# Patient Record
Sex: MALE | Race: WHITE | NOT HISPANIC OR LATINO | URBAN - METROPOLITAN AREA
[De-identification: names, ages, dates, MRNs, and addresses within clinical notes are randomized per-mention and may not be internally consistent; named-entity substitution may affect disease eponyms.]

---
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
## 2021-02-26 ENCOUNTER — VIRTUAL VISIT (OUTPATIENT)
Dept: FAMILY MEDICINE | Facility: CLINIC | Age: 21
End: 2021-02-26
Payer: COMMERCIAL

## 2021-02-26 DIAGNOSIS — J02.9 SORE THROAT: Primary | ICD-10-CM

## 2021-02-26 PROCEDURE — 99203 OFFICE O/P NEW LOW 30 MIN: CPT | Mod: TEL | Performed by: NURSE PRACTITIONER

## 2021-02-26 RX ORDER — AMOXICILLIN 500 MG/1
500 CAPSULE ORAL 2 TIMES DAILY
Qty: 20 CAPSULE | Refills: 0 | Status: SHIPPED | OUTPATIENT
Start: 2021-02-26 | End: 2021-03-08

## 2021-02-26 NOTE — PROGRESS NOTES
Floyd is a 20 year old who is being evaluated via a billable telephone visit.      What phone number would you like to be contacted at? [phone]  How would you like to obtain your AVS?     Assessment & Plan   Problem List Items Addressed This Visit     None      Visit Diagnoses     Sore throat    -  Primary    Relevant Medications    amoxicillin (AMOXIL) 500 MG capsule         Treating for presumed strep throat  amoxicillin 500 mg BID x 10 days   gargle salt and water     5 minutes spent on the date of the encounter doing chart review, history and exam, documentation and further activities as noted above  0956}     PRN    No follow-ups on file.    GAY Vargas CNP  [facility]   Floyd is a 20 year old who presents for the following health issues     HPI       Acute Illness  Acute illness concerns: sore throat  Onset/Duration: 4 days, painful to swallow   Symptoms:  Fever: no  Chills/Sweats: YES  Headache (location?): YES  Sinus Pressure: no  Conjunctivitis:  no  Ear Pain: no  Rhinorrhea: YES  Congestion: YES  Sore Throat: YES  Cough: no  Wheeze: no  Decreased Appetite: no  Nausea: no  Vomiting: no  Diarrhea: no  Dysuria/Freq.: no  Dysuria or Hematuria: no  Fatigue/Achiness: no  Sick/Strep Exposure: no  Therapies tried and outcome: Ibuprofen, Nyquil, Dayquil   Hurts to swallow no white patches or drainage that he can see   Had strep last year       Review of Systems   Constitutional, HEENT, cardiovascular, pulmonary, GI, , musculoskeletal, neuro, skin, endocrine and psych systems are negative, except as otherwise noted.      Objective           Vitals:  No vitals were obtained today due to virtual visit.    Physical Exam   healthy, alert and no distress  PSYCH: Alert and oriented times 3; coherent speech, normal   rate and volume, able to articulate logical thoughts, able   to abstract reason, no tangential thoughts2, no hallucinations   or delusions  His  affect is normal  RESP: No cough, no audible wheezing, able to talk in full sentences  Remainder of exam unable to be completed due to telephone visits    No results found for any previous visit.               Phone call duration: 5 minutes

## 2022-06-27 ENCOUNTER — OFFICE VISIT (OUTPATIENT)
Dept: URGENT CARE | Facility: URGENT CARE | Age: 22
End: 2022-06-27
Payer: COMMERCIAL

## 2022-06-27 VITALS
OXYGEN SATURATION: 98 % | TEMPERATURE: 99 F | DIASTOLIC BLOOD PRESSURE: 89 MMHG | HEART RATE: 72 BPM | SYSTOLIC BLOOD PRESSURE: 151 MMHG

## 2022-06-27 DIAGNOSIS — N50.812 PAIN IN BOTH TESTICLES: Primary | ICD-10-CM

## 2022-06-27 DIAGNOSIS — N50.811 PAIN IN BOTH TESTICLES: Primary | ICD-10-CM

## 2022-06-27 PROCEDURE — 99203 OFFICE O/P NEW LOW 30 MIN: CPT | Performed by: FAMILY MEDICINE

## 2022-06-27 NOTE — PROGRESS NOTES
Assessment & Plan     Pain in both testicles  probably related to muscular. No clear hernia. And no evidence of torsion. Low likelihood of epididymitis/uti/sti. Discussed testing but instead he wishes to hold on this and treat conservatively. He has an appt in about couple weeks. follow up sooner if symptoms worsen.        [id number]}    Return in about 2 weeks (around 7/11/2022) for follow up appointment as needed. .    Vito Eldridge MD  [facility]    ------------------------------------------------------------------------  Subjective     Floyd Hong العراقي presents to clinic today for the following health issues:  chief complaint  HPI  5 days of intermittent bilaterally testicular pian worse on the left without associated symptoms. No fever nor dysuria/discharge nor swelling noted. He has not taken anything for the pain it resolved.     He had a suspected a pimple that is resolving but that is not really the area of pain.     No new sexual partners but one who was tested not long ago and normal.     Some heavier lifting doing warehouse work. No other new actiivities.   Review of Systems        Objective    BP (!) 151/89 (BP Location: Right arm, Patient Position: Sitting, Cuff Size: Adult Regular)   Pulse 72   Temp 99  F (37.2  C)   SpO2 98%   Physical Exam   GENERAL: healthy, alert and no distress   (male): normal male genitalia without lesions or urethral discharge, no hernia except there is one suspected acne cyst at the base of the penis towards the right side.

## 2022-08-03 ENCOUNTER — OFFICE VISIT (OUTPATIENT)
Dept: URGENT CARE | Facility: URGENT CARE | Age: 22
End: 2022-08-03
Payer: COMMERCIAL

## 2022-08-03 VITALS
DIASTOLIC BLOOD PRESSURE: 72 MMHG | RESPIRATION RATE: 20 BRPM | SYSTOLIC BLOOD PRESSURE: 138 MMHG | HEART RATE: 78 BPM | TEMPERATURE: 98 F | OXYGEN SATURATION: 98 %

## 2022-08-03 DIAGNOSIS — B35.0 TINEA CAPITIS: Primary | ICD-10-CM

## 2022-08-03 PROCEDURE — 99213 OFFICE O/P EST LOW 20 MIN: CPT | Performed by: PHYSICIAN ASSISTANT

## 2022-08-03 RX ORDER — TRIAMCINOLONE ACETONIDE 1 MG/G
CREAM TOPICAL 2 TIMES DAILY
Qty: 28 G | Refills: 0 | Status: SHIPPED | OUTPATIENT
Start: 2022-08-03

## 2022-08-03 RX ORDER — TERBINAFINE HYDROCHLORIDE 250 MG/1
250 TABLET ORAL DAILY
Qty: 21 TABLET | Refills: 0 | Status: SHIPPED | OUTPATIENT
Start: 2022-08-03 | End: 2022-08-24